# Patient Record
Sex: FEMALE | Race: WHITE | NOT HISPANIC OR LATINO | Employment: FULL TIME | URBAN - METROPOLITAN AREA
[De-identification: names, ages, dates, MRNs, and addresses within clinical notes are randomized per-mention and may not be internally consistent; named-entity substitution may affect disease eponyms.]

---
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
## 2020-02-16 ENCOUNTER — HOSPITAL ENCOUNTER (EMERGENCY)
Facility: HOSPITAL | Age: 20
Discharge: HOME | End: 2020-02-16
Attending: EMERGENCY MEDICINE | Admitting: EMERGENCY MEDICINE
Payer: COMMERCIAL

## 2020-02-16 ENCOUNTER — APPOINTMENT (EMERGENCY)
Dept: RADIOLOGY | Facility: HOSPITAL | Age: 20
End: 2020-02-16
Payer: COMMERCIAL

## 2020-02-16 VITALS
TEMPERATURE: 98 F | SYSTOLIC BLOOD PRESSURE: 142 MMHG | WEIGHT: 240 LBS | HEART RATE: 75 BPM | BODY MASS INDEX: 41.2 KG/M2 | OXYGEN SATURATION: 100 % | DIASTOLIC BLOOD PRESSURE: 77 MMHG | RESPIRATION RATE: 18 BRPM

## 2020-02-16 DIAGNOSIS — M77.8 TENDINITIS OF RIGHT WRIST: Primary | ICD-10-CM

## 2020-02-16 PROCEDURE — 99282 EMERGENCY DEPT VISIT SF MDM: CPT | Performed by: EMERGENCY MEDICINE

## 2020-02-16 PROCEDURE — 99283 EMERGENCY DEPT VISIT LOW MDM: CPT

## 2020-02-16 PROCEDURE — 73110 X-RAY EXAM OF WRIST: CPT

## 2020-02-16 RX ORDER — IBUPROFEN 400 MG/1
400 TABLET ORAL ONCE
Status: COMPLETED | OUTPATIENT
Start: 2020-02-16 | End: 2020-02-16

## 2020-02-16 RX ADMIN — IBUPROFEN 400 MG: 400 TABLET ORAL at 12:09

## 2020-02-16 NOTE — DISCHARGE INSTRUCTIONS
Return immediately if worse or any new symptoms  Tylenol 1000 mg every 6 hours as needed  and/or  Advil 400 mg every 6 hours as needed  May take both together  Ice 20 minutes hourly as needed

## 2020-02-16 NOTE — ED NOTES
Quickfit wrist II universal splint applied to the right wrist   Patient verbalized understanding of discharge instructions including medication administration and follow-up care  No further questions or concerns at this time          Afua Zavala RN  02/16/20 5945

## 2020-02-16 NOTE — ED PROVIDER NOTES
History  Chief Complaint   Patient presents with    Wrist Pain     Pt states she was at '[employer] practice yesterday when she noticed her R wrist started to bother her and swell  Pt used ice and advil for pain  Generalized right wrist pain worse after color guard Practice then competition over the past 2 days  Feels swollen  No numbness or weakness  No other complaints  Similar symptoms in the past  No direct injury      Wrist Pain   Onset quality:  Gradual  Timing:  Constant  Progression:  Unchanged      Prior to Admission Medications   Prescriptions Last Dose Informant Patient Reported? Taking? methylprednisolone (MEDROL) 4 mg tablet Not Taking at Unknown time  No No   Sig: Medrol dosepak, take as directed   Patient not taking: Reported on 2/16/2020      Facility-Administered Medications: None       Past Medical History:   Diagnosis Date    Anxiety     Depression     History of posttraumatic stress disorder (PTSD)        Past Surgical History:   Procedure Laterality Date    TYMPANOSTOMY TUBE PLACEMENT         History reviewed  No pertinent family history  I have reviewed and agree with the history as documented  Social History     Tobacco Use    Smoking status: Never Smoker    Smokeless tobacco: Never Used   Substance Use Topics    Alcohol use: No    Drug use: No       Review of Systems   All other systems reviewed and are negative  Physical Exam  Physical Exam   Constitutional: She appears well-developed  No distress  Musculoskeletal:   Right upper extremity   No shoulder or elbow tenderness forearm generally soft nontender, diffuse wrist tenderness with decreased range of motion, not obviously swollen compared to opposite, no hand or finger tenderness, good  strength, capillary refill   Nursing note and vitals reviewed        Vital Signs  ED Triage Vitals [02/16/20 1153]   Temperature Pulse Respirations Blood Pressure SpO2   98 °F (36 7 °C) 75 18 142/77 100 %      Temp Source Heart Rate Source Patient Position - Orthostatic VS BP Location FiO2 (%)   Oral Monitor Lying Left arm --      Pain Score       4           Vitals:    02/16/20 1153   BP: 142/77   Pulse: 75   Patient Position - Orthostatic VS: Lying         Visual Acuity      ED Medications  Medications   ibuprofen (MOTRIN) tablet 400 mg (400 mg Oral Given 2/16/20 1209)       Diagnostic Studies  Results Reviewed     None                 XR wrist 3+ views RIGHT   ED Interpretation by Cathie Back DO (02/16 1257)   No fracture                 Procedures  Procedures         ED Course  ED Course as of Feb 16 1302   Sun Feb 16, 2020   1257 We discussed results and agreeable with close outpatient follow-up, splinting, Tylenol and Advil, follow-up with her family physician                                  MDM      Disposition  Final diagnoses:   Tendinitis of right wrist     Time reflects when diagnosis was documented in both MDM as applicable and the Disposition within this note     Time User Action Codes Description Comment    2/16/2020 12:59 PM Vonnie Richardson Add [M77 8] Tendinitis of right wrist       ED Disposition     ED Disposition Condition Date/Time Comment    Discharge Stable Sun Feb 16, 2020 12:59 PM Mariela Aguilar discharge to home/self care  Follow-up Information     Follow up With Specialties Details Why [address] [facility] Schedule an appointment as soon as possible for a visit in 1 week  Paul Andujar [phone]  [phone]            Patient's Medications   Discharge Prescriptions    No medications on file     No discharge procedures on file      PDMP Review     None          ED Provider  Electronically Signed by           Cathie Back DO  02/16/20 1302

## 2020-06-14 ENCOUNTER — HOSPITAL ENCOUNTER (EMERGENCY)
Facility: HOSPITAL | Age: 20
Discharge: HOME | End: 2020-06-14
Attending: EMERGENCY MEDICINE | Admitting: EMERGENCY MEDICINE
Payer: COMMERCIAL

## 2020-06-14 ENCOUNTER — APPOINTMENT (EMERGENCY)
Dept: RADIOLOGY | Facility: HOSPITAL | Age: 20
End: 2020-06-14
Payer: COMMERCIAL

## 2020-06-14 VITALS
RESPIRATION RATE: 20 BRPM | SYSTOLIC BLOOD PRESSURE: 169 MMHG | HEIGHT: 64 IN | OXYGEN SATURATION: 98 % | DIASTOLIC BLOOD PRESSURE: 105 MMHG | BODY MASS INDEX: 47.8 KG/M2 | WEIGHT: 280 LBS | TEMPERATURE: 98.7 F | HEART RATE: 78 BPM

## 2020-06-14 DIAGNOSIS — S63.634A SPRAIN OF INTERPHALANGEAL JOINT OF RIGHT RING FINGER, INITIAL ENCOUNTER: Primary | ICD-10-CM

## 2020-06-14 PROCEDURE — 99284 EMERGENCY DEPT VISIT MOD MDM: CPT | Performed by: EMERGENCY MEDICINE

## 2020-06-14 PROCEDURE — 73140 X-RAY EXAM OF FINGER(S): CPT

## 2020-06-14 PROCEDURE — 99283 EMERGENCY DEPT VISIT LOW MDM: CPT

## 2020-06-14 RX ORDER — NORETHINDRONE ACETATE AND ETHINYL ESTRADIOL 1MG-20(21)
1 KIT ORAL
COMMUNITY
Start: 2016-03-09 | End: 2022-05-18

## 2020-06-14 RX ORDER — MELATONIN 10 MG
10 CAPSULE ORAL
COMMUNITY

## 2020-09-27 ENCOUNTER — APPOINTMENT (EMERGENCY)
Dept: RADIOLOGY | Facility: HOSPITAL | Age: 20
End: 2020-09-27
Payer: COMMERCIAL

## 2020-09-27 ENCOUNTER — HOSPITAL ENCOUNTER (EMERGENCY)
Facility: HOSPITAL | Age: 20
Discharge: HOME | End: 2020-09-27
Attending: EMERGENCY MEDICINE | Admitting: EMERGENCY MEDICINE
Payer: COMMERCIAL

## 2020-09-27 VITALS
BODY MASS INDEX: 50.06 KG/M2 | SYSTOLIC BLOOD PRESSURE: 137 MMHG | TEMPERATURE: 96.9 F | RESPIRATION RATE: 18 BRPM | OXYGEN SATURATION: 97 % | DIASTOLIC BLOOD PRESSURE: 92 MMHG | HEART RATE: 113 BPM | WEIGHT: 291.67 LBS

## 2020-09-27 DIAGNOSIS — S90.32XA CONTUSION OF LEFT FOOT, INITIAL ENCOUNTER: Primary | ICD-10-CM

## 2020-09-27 PROCEDURE — 99284 EMERGENCY DEPT VISIT MOD MDM: CPT | Performed by: EMERGENCY MEDICINE

## 2020-09-27 PROCEDURE — 73630 X-RAY EXAM OF FOOT: CPT

## 2020-09-27 PROCEDURE — 99283 EMERGENCY DEPT VISIT LOW MDM: CPT

## 2020-09-27 NOTE — ED PROVIDER NOTES
History  Chief Complaint   Patient presents with   [address] [facility] Injury     Patient dropped a rifle on left foot  Patient is a 42-year-old presenting to the emergency department for complaints of injury to left foot that occurred on Friday, she was practicing color guard and her rifle fell on her foot causing the injury, she has been ambulating on it but it has caused her has significant amount of pain, she took Tylenol prior to coming to the emergency department, denies pain or injury elsewhere          Prior to Admission Medications   Prescriptions Last Dose Informant Patient Reported? Taking? Melatonin 10 MG CAPS   Yes No   Sig: Take 10 mg by mouth   norethindrone-ethinyl estradiol (JUNEL FE 1/20) 1-20 MG-MCG per tablet   Yes No   Sig: Take 1 tablet by mouth      Facility-Administered Medications: None       Past Medical History:   Diagnosis Date    ADHD     Anxiety     Depression     History of posttraumatic stress disorder (PTSD)        Past Surgical History:   Procedure Laterality Date    TYMPANOSTOMY TUBE PLACEMENT         History reviewed  No pertinent family history  I have reviewed and agree with the history as documented  E-Cigarette/Vaping    E-Cigarette Use Never User      E-Cigarette/Vaping Substances    Nicotine No     THC No     CBD No     Flavoring No     Other No     Unknown No      Social History     Tobacco Use    Smoking status: Never Smoker    Smokeless tobacco: Never Used   Substance Use Topics    Alcohol use: No    Drug use: No       Review of Systems   Constitutional: Negative  HENT: Negative  Eyes: Negative  Respiratory: Negative  Cardiovascular: Negative  Gastrointestinal: Negative  Endocrine: Negative  Genitourinary: Negative  Musculoskeletal: Positive for arthralgias (Left foot pain)  Skin: Negative  Allergic/Immunologic: Negative  Neurological: Negative  Hematological: Negative  Psychiatric/Behavioral: Negative          Physical Exam  Physical Exam  Constitutional:       Appearance: She is well-developed  HENT:      Head: Normocephalic and atraumatic  Eyes:      Conjunctiva/sclera: Conjunctivae normal       Pupils: Pupils are equal, round, and reactive to light  Neck:      Musculoskeletal: Normal range of motion and neck supple  Cardiovascular:      Rate and Rhythm: Normal rate  Pulmonary:      Effort: Pulmonary effort is normal    Abdominal:      Palpations: Abdomen is soft  Musculoskeletal: Normal range of motion  Feet:    Feet:      Comments: Tenderness with slight ecchymosis and swelling over the base of the 5th metatarsal of the left foot, distal sensation and motor is intact with brisk capillary refill  Skin:     General: Skin is warm and dry  Neurological:      Mental Status: She is alert and oriented to person, place, and time           Vital Signs  ED Triage Vitals [09/27/20 1906]   Temperature Pulse Respirations Blood Pressure SpO2   (!) 96 9 °F (36 1 °C) (!) 113 18 137/92 97 %      Temp Source Heart Rate Source Patient Position - Orthostatic VS BP Location FiO2 (%)   Temporal Monitor Sitting Right arm --      Pain Score       --           Vitals:    09/27/20 1906   BP: 137/92   Pulse: (!) 113   Patient Position - Orthostatic VS: Sitting             ED Medications  Medications - No data to display    Diagnostic Studies  Results Reviewed     None                 XR foot 3+ views LEFT   ED Interpretation by Kris Ryan DO (09/27 1930)   No acute findings                       ED Course  ED Course as of Sep 27 1942   Sun Sep 27, 2020   1941 Imaging findings unremarkable and discussed at bedside, patient advised to take over-the-counter anti-inflammatories, ice and elevate, follow up with primary care as needed or return if symptoms worsen, patient acknowledges understanding and agreement with this plan                                            Disposition  Final diagnoses:   Contusion of left foot, initial encounter     Time reflects when diagnosis was documented in both MDM as applicable and the Disposition within this note     Time User Action Codes Description Comment    9/27/2020  7:30 PM Lissette Savage [place] [place] Contusion of left foot, initial encounter       ED Disposition     ED Disposition Condition Date/Time Comment    Discharge Stable Sun Sep 27, 2020  7:30 PM Philomena Aguilar discharge to home/self care  Follow-up Information     Follow up With Specialties Details Why Contact Info    Lj Freed DO General Practice In 2 days  Paul Andujar [phone]  [phone]            Discharge Medication List as of 9/27/2020  7:30 PM      CONTINUE these medications which have NOT CHANGED    Details   Melatonin 10 MG CAPS Take 10 mg by mouth, Historical Med      norethindrone-ethinyl estradiol (JUNEL FE 1/20) 1-20 MG-MCG per tablet Take 1 tablet by mouth, Starting Wed 3/9/2016, Historical Med           No discharge procedures on file      PDMP Review     None          ED Provider  Electronically Signed by           Sravan Feliciano DO  09/27/20 1949

## 2021-03-10 DIAGNOSIS — Z23 ENCOUNTER FOR IMMUNIZATION: ICD-10-CM

## 2022-05-18 ENCOUNTER — HOSPITAL ENCOUNTER (EMERGENCY)
Facility: HOSPITAL | Age: 22
Discharge: HOME | End: 2022-05-18
Attending: EMERGENCY MEDICINE
Payer: COMMERCIAL

## 2022-05-18 VITALS
OXYGEN SATURATION: 98 % | DIASTOLIC BLOOD PRESSURE: 98 MMHG | HEART RATE: 103 BPM | SYSTOLIC BLOOD PRESSURE: 164 MMHG | BODY MASS INDEX: 50.02 KG/M2 | HEIGHT: 64 IN | WEIGHT: 293 LBS | TEMPERATURE: 96.1 F | RESPIRATION RATE: 16 BRPM

## 2022-05-18 DIAGNOSIS — J03.90 TONSILLITIS: Primary | ICD-10-CM

## 2022-05-18 LAB — S PYO DNA THROAT QL NAA+PROBE: NOT DETECTED

## 2022-05-18 PROCEDURE — 99284 EMERGENCY DEPT VISIT MOD MDM: CPT | Performed by: PHYSICIAN ASSISTANT

## 2022-05-18 PROCEDURE — 87651 STREP A DNA AMP PROBE: CPT | Performed by: PHYSICIAN ASSISTANT

## 2022-05-18 PROCEDURE — 96372 THER/PROPH/DIAG INJ SC/IM: CPT

## 2022-05-18 PROCEDURE — 99283 EMERGENCY DEPT VISIT LOW MDM: CPT

## 2022-05-18 RX ORDER — AMOXICILLIN AND CLAVULANATE POTASSIUM 875; 125 MG/1; MG/1
1 TABLET, FILM COATED ORAL EVERY 12 HOURS SCHEDULED
Qty: 14 TABLET | Refills: 0 | Status: SHIPPED | OUTPATIENT
Start: 2022-05-18 | End: 2022-05-25

## 2022-05-18 RX ORDER — FLUTICASONE PROPIONATE 50 MCG
1 SPRAY, SUSPENSION (ML) NASAL DAILY
Qty: 16 G | Refills: 0 | Status: SHIPPED | OUTPATIENT
Start: 2022-05-18

## 2022-05-18 RX ORDER — KETOROLAC TROMETHAMINE 30 MG/ML
30 INJECTION, SOLUTION INTRAMUSCULAR; INTRAVENOUS ONCE
Status: COMPLETED | OUTPATIENT
Start: 2022-05-18 | End: 2022-05-18

## 2022-05-18 RX ORDER — BUSPIRONE HYDROCHLORIDE 5 MG/1
5 TABLET ORAL 3 TIMES DAILY
COMMUNITY
Start: 2022-04-26

## 2022-05-18 RX ORDER — DEXAMETHASONE SODIUM PHOSPHATE 4 MG/ML
10 INJECTION, SOLUTION INTRA-ARTICULAR; INTRALESIONAL; INTRAMUSCULAR; INTRAVENOUS; SOFT TISSUE ONCE
Status: COMPLETED | OUTPATIENT
Start: 2022-05-18 | End: 2022-05-18

## 2022-05-18 RX ORDER — AMOXICILLIN AND CLAVULANATE POTASSIUM 875; 125 MG/1; MG/1
1 TABLET, FILM COATED ORAL ONCE
Status: COMPLETED | OUTPATIENT
Start: 2022-05-18 | End: 2022-05-18

## 2022-05-18 RX ORDER — TESTOSTERONE GEL, 1% 10 MG/G
GEL TRANSDERMAL
COMMUNITY
Start: 2021-12-20

## 2022-05-18 RX ORDER — ONDANSETRON 4 MG/1
4 TABLET, ORALLY DISINTEGRATING ORAL EVERY 6 HOURS PRN
Qty: 20 TABLET | Refills: 0 | Status: SHIPPED | OUTPATIENT
Start: 2022-05-18

## 2022-05-18 RX ORDER — NAPROXEN 500 MG/1
500 TABLET ORAL 2 TIMES DAILY WITH MEALS
Qty: 14 TABLET | Refills: 0 | Status: SHIPPED | OUTPATIENT
Start: 2022-05-18 | End: 2022-05-25

## 2022-05-18 RX ADMIN — AMOXICILLIN AND CLAVULANATE POTASSIUM 1 TABLET: 875; 125 TABLET, FILM COATED ORAL at 14:35

## 2022-05-18 RX ADMIN — KETOROLAC TROMETHAMINE 30 MG: 30 INJECTION, SOLUTION INTRAMUSCULAR at 14:00

## 2022-05-18 RX ADMIN — DEXAMETHASONE SODIUM PHOSPHATE 10 MG: 4 INJECTION, SOLUTION INTRAMUSCULAR; INTRAVENOUS at 14:02

## 2022-05-18 NOTE — ED PROVIDER NOTES
History  Chief Complaint   Patient presents with    Sore Throat     Ongoing for 1 week, finished steroids with no relief     The patient is a 58-year-old female who presents emergency department today with a complaint of sore throat and enlarged tonsils  The patient states that she sought evaluation 5 days ago at a local urgent care for her enlarged tonsils and sore throat  She states that she does suffer from seasonal allergies had has been taking daily OTC Claritin  She was strep tested, found to be negative, discharged on 4 days of 50 mg prednisone  She states that this was finished however the symptoms have worsened  She does have nasal congestion, rhinorrhea and postnasal drip  No drooling, fevers chills, pain with neck movement, dental pain shortness of breath, wheezing, chest pain  Hasn't taken anything PTA  Sore Throat  Location:  Generalized  Quality:  Sore  Severity:  Severe  Onset quality:  Gradual  Timing:  Constant  Progression:  Worsening  Chronicity:  New  Relieved by:  Nothing  Worsened by:  Swallowing  Ineffective treatments: predisone  Associated symptoms: postnasal drip    Associated symptoms: no adenopathy, no chest pain, no cough, no drooling, no ear discharge, no ear pain, no epistaxis, no eye discharge, no plugged ear sensation, no shortness of breath, no trouble swallowing and no voice change        Prior to Admission Medications   Prescriptions Last Dose Informant Patient Reported? Taking? Melatonin 10 MG CAPS   Yes No   Sig: Take 10 mg by mouth   busPIRone (BUSPAR) 5 mg tablet   Yes Yes   Sig: Take 5 mg by mouth in the morning and 5 mg at noon and 5 mg in the evening     testosterone (ANDROGEL) 1%   Yes Yes      Facility-Administered Medications: None       Past Medical History:   Diagnosis Date    ADHD     Anxiety     Depression     History of posttraumatic stress disorder (PTSD)        Past Surgical History:   Procedure Laterality Date    TYMPANOSTOMY TUBE PLACEMENT History reviewed  No pertinent family history  I have reviewed and agree with the history as documented  E-Cigarette/Vaping    E-Cigarette Use Never User      E-Cigarette/Vaping Substances    Nicotine No     THC No     CBD No     Flavoring No     Other No     Unknown No      Social History     Tobacco Use    Smoking status: Never Smoker    Smokeless tobacco: Never Used   Vaping Use    Vaping Use: Never used   Substance Use Topics    Alcohol use: No    Drug use: No       Review of Systems   HENT: Positive for postnasal drip and sore throat  Negative for drooling, ear discharge, ear pain, nosebleeds, trouble swallowing and voice change  Eyes: Negative for discharge  Respiratory: Negative for cough and shortness of breath  Cardiovascular: Negative for chest pain  Hematological: Negative for adenopathy  All other systems reviewed and are negative  Physical Exam  Physical Exam  Vitals and nursing note reviewed  Constitutional:       General: She is not in acute distress  Appearance: She is well-developed  HENT:      Head: Normocephalic and atraumatic  Nose: No congestion or rhinorrhea  Mouth/Throat:      Mouth: Mucous membranes are moist  No angioedema  Dentition: No gingival swelling or dental abscesses  Pharynx: Uvula midline  Posterior oropharyngeal erythema present  No oropharyngeal exudate or uvula swelling  Tonsils: No tonsillar exudate or tonsillar abscesses  2+ on the right  2+ on the left  Comments: +postnasal drip   Eyes:      Conjunctiva/sclera: Conjunctivae normal       Pupils: Pupils are equal, round, and reactive to light  Cardiovascular:      Rate and Rhythm: Normal rate and regular rhythm  Heart sounds: Normal heart sounds  No murmur heard  Pulmonary:      Effort: Pulmonary effort is normal  No respiratory distress  Breath sounds: Normal breath sounds  Abdominal:      Palpations: Abdomen is soft        Tenderness: There is no abdominal tenderness  Musculoskeletal:      Cervical back: Normal range of motion and neck supple  Skin:     General: Skin is warm and dry  Capillary Refill: Capillary refill takes less than 2 seconds  Neurological:      General: No focal deficit present  Mental Status: She is alert and oriented to person, place, and time           Vital Signs  ED Triage Vitals [05/18/22 1328]   Temperature Pulse Respirations Blood Pressure SpO2   (!) 96 1 °F (35 6 °C) 103 16 164/98 98 %      Temp Source Heart Rate Source Patient Position - Orthostatic VS BP Location FiO2 (%)   Temporal Monitor Sitting Right arm --      Pain Score       7           Vitals:    05/18/22 1328   BP: 164/98   Pulse: 103   Patient Position - Orthostatic VS: Sitting         Visual Acuity      ED Medications  Medications   ketorolac (TORADOL) injection 30 mg (30 mg Intramuscular Given 5/18/22 1400)   dexamethasone (DECADRON) injection 10 mg (10 mg Intramuscular Given 5/18/22 1402)   amoxicillin-clavulanate (AUGMENTIN) 875-125 mg per tablet 1 tablet (1 tablet Oral Given 5/18/22 1435)       Diagnostic Studies  Results Reviewed     Procedure Component Value Units Date/Time    Strep A PCR [[id number]]  (Normal) Collected: 05/18/22 1352    Lab Status: Final result Specimen: Throat Updated: 05/18/22 1421     STREP A PCR Not Detected                 No orders to display              Procedures  Procedures         ED Course                                             MDM  Number of Diagnoses or Management Options     Amount and/or Complexity of Data Reviewed  Clinical lab tests: ordered and reviewed  Decide to obtain previous medical records or to obtain history from someone other than the patient: yes  Review and summarize past medical records: yes  Independent visualization of images, tracings, or specimens: yes    Risk of Complications, Morbidity, and/or Mortality  Presenting problems: low  Diagnostic procedures: low  Management options: low    Patient Progress  Patient progress: stable      Disposition  Final diagnoses: Tonsillitis     Time reflects when diagnosis was documented in both MDM as applicable and the Disposition within this note     Time User Action Codes Description Comment    5/18/2022  2:31 PM Casie Posadas Adalberto [T60 26] Tonsillitis       ED Disposition     ED Disposition   Discharge    Condition   Stable    Date/Time   Wed May 18, 2022  2:31 PM    Comment   Constantine Pope Narcisofrchance discharge to home/self care  Follow-up Information    None         Discharge Medication List as of 5/18/2022  2:32 PM      START taking these medications    Details   amoxicillin-clavulanate (AUGMENTIN) 875-125 mg per tablet Take 1 tablet by mouth every 12 (twelve) hours for 7 days, Starting Wed 5/18/2022, Until Wed 5/25/2022, Normal      fluticasone (FLONASE) 50 mcg/act nasal spray 1 spray into each nostril in the morning , Starting Wed 5/18/2022, Normal      naproxen (NAPROSYN) 500 mg tablet Take 1 tablet (500 mg total) by mouth in the morning and 1 tablet (500 mg total) in the evening  Take with meals  Do all this for 7 days  , Starting Wed 5/18/2022, Until Wed 5/25/2022, Normal      ondansetron (Zofran ODT) 4 mg disintegrating tablet Take 1 tablet (4 mg total) by mouth every 6 (six) hours as needed for nausea or vomiting, Starting Wed 5/18/2022, Normal         CONTINUE these medications which have NOT CHANGED    Details   busPIRone (BUSPAR) 5 mg tablet Take 5 mg by mouth in the morning and 5 mg at noon and 5 mg in the evening , Starting Tue 4/26/2022, Historical Med      testosterone (ANDROGEL) 1% Starting Mon 12/20/2021, Historical Med      Melatonin 10 MG CAPS Take 10 mg by mouth, Historical Med             No discharge procedures on file      PDMP Review     None          ED Provider  Electronically Signed by           Hever Rucker PA-C  05/18/22 7441

## 2023-06-08 ENCOUNTER — APPOINTMENT (EMERGENCY)
Dept: RADIOLOGY | Facility: HOSPITAL | Age: 23
End: 2023-06-08
Payer: COMMERCIAL

## 2023-06-08 ENCOUNTER — HOSPITAL ENCOUNTER (EMERGENCY)
Facility: HOSPITAL | Age: 23
Discharge: HOME | End: 2023-06-08
Attending: EMERGENCY MEDICINE | Admitting: EMERGENCY MEDICINE
Payer: COMMERCIAL

## 2023-06-08 VITALS
DIASTOLIC BLOOD PRESSURE: 79 MMHG | OXYGEN SATURATION: 97 % | TEMPERATURE: 98.1 F | SYSTOLIC BLOOD PRESSURE: 131 MMHG | HEART RATE: 81 BPM | RESPIRATION RATE: 18 BRPM

## 2023-06-08 DIAGNOSIS — J40 BRONCHITIS: Primary | ICD-10-CM

## 2023-06-08 PROCEDURE — 71046 X-RAY EXAM CHEST 2 VIEWS: CPT

## 2023-06-08 RX ORDER — PREDNISONE 20 MG/1
40 TABLET ORAL DAILY
Qty: 10 TABLET | Refills: 0 | Status: SHIPPED | OUTPATIENT
Start: 2023-06-08 | End: 2023-06-13

## 2023-06-08 RX ORDER — CEFUROXIME AXETIL 500 MG/1
500 TABLET ORAL EVERY 12 HOURS SCHEDULED
Qty: 20 TABLET | Refills: 0 | Status: SHIPPED | OUTPATIENT
Start: 2023-06-08 | End: 2023-06-18

## 2023-06-08 RX ORDER — ALBUTEROL SULFATE 90 UG/1
2 AEROSOL, METERED RESPIRATORY (INHALATION) EVERY 6 HOURS PRN
Qty: 6.7 G | Refills: 0 | Status: SHIPPED | OUTPATIENT
Start: 2023-06-08

## 2023-06-08 NOTE — ED PROVIDER NOTES
History  Chief Complaint   Patient presents with   • Cough     Patient c/o cough     Patient been sick for the past 2 weeks  Congestion with a cough  No bringing up phlegm  Does not smoke  No fevers or chills  No nausea vomiting or diarrhea  No ear pain  No headaches  History provided by:  Patient   used: No    Cough  Cough characteristics:  Productive  Sputum characteristics:  Nondescript  Severity:  Mild  Onset quality:  Gradual  Duration:  2 weeks  Timing:  Constant  Progression:  Worsening  Chronicity:  New  Smoker: no    Context: smoke exposure and upper respiratory infection    Context: not animal exposure, not exposure to allergens and not weather changes    Relieved by:  Nothing  Worsened by:  Nothing  Ineffective treatments:  None tried  Associated symptoms: rhinorrhea and sinus congestion    Associated symptoms: no chest pain, no chills, no diaphoresis, no ear pain, no eye discharge, no fever, no headaches, no rash, no shortness of breath, no sore throat and no wheezing        Prior to Admission Medications   Prescriptions Last Dose Informant Patient Reported? Taking? Melatonin 10 MG CAPS   Yes No   Sig: Take 10 mg by mouth   busPIRone (BUSPAR) 5 mg tablet   Yes No   Sig: Take 5 mg by mouth in the morning and 5 mg at noon and 5 mg in the evening  fluticasone (FLONASE) 50 mcg/act nasal spray   No No   Si spray into each nostril in the morning  naproxen (NAPROSYN) 500 mg tablet   No No   Sig: Take 1 tablet (500 mg total) by mouth in the morning and 1 tablet (500 mg total) in the evening  Take with meals  Do all this for 7 days     ondansetron (Zofran ODT) 4 mg disintegrating tablet   No No   Sig: Take 1 tablet (4 mg total) by mouth every 6 (six) hours as needed for nausea or vomiting   testosterone (ANDROGEL) 1%   Yes No      Facility-Administered Medications: None       Past Medical History:   Diagnosis Date   • ADHD    • Anxiety    • Depression    • History of posttraumatic stress disorder (PTSD)        Past Surgical History:   Procedure Laterality Date   • TYMPANOSTOMY TUBE PLACEMENT         History reviewed  No pertinent family history  I have reviewed and agree with the history as documented  E-Cigarette/Vaping   • E-Cigarette Use Never User      E-Cigarette/Vaping Substances   • Nicotine No    • THC No    • CBD No    • Flavoring No    • Other No    • Unknown No      Social History     Tobacco Use   • Smoking status: Never   • Smokeless tobacco: Never   Vaping Use   • Vaping Use: Never used   Substance Use Topics   • Alcohol use: No   • Drug use: No       Review of Systems   Constitutional: Negative for chills, diaphoresis and fever  HENT: Positive for rhinorrhea  Negative for ear pain, hearing loss, sore throat, trouble swallowing and voice change  Eyes: Negative for pain and discharge  Respiratory: Positive for cough  Negative for shortness of breath and wheezing  Cardiovascular: Negative for chest pain and palpitations  Gastrointestinal: Negative for abdominal pain, blood in stool, constipation, diarrhea, nausea and vomiting  Genitourinary: Negative for dysuria, flank pain, frequency and hematuria  Musculoskeletal: Negative for joint swelling, neck pain and neck stiffness  Skin: Negative for rash and wound  Neurological: Negative for dizziness, seizures, syncope, facial asymmetry and headaches  Psychiatric/Behavioral: Negative for hallucinations, self-injury and suicidal ideas  All other systems reviewed and are negative  Physical Exam  Physical Exam  Vitals and nursing note reviewed  Constitutional:       General: Anayeli Laird is not in acute distress  Appearance: Sherial Showers Lauren is well-developed  HENT:      Head: Normocephalic and atraumatic  Right Ear: External ear normal       Left Ear: External ear normal    Eyes:      General: No scleral icterus  Right eye: No discharge           Left eye: No discharge  Extraocular Movements: Extraocular movements intact  Conjunctiva/sclera: Conjunctivae normal    Cardiovascular:      Rate and Rhythm: Normal rate and regular rhythm  Heart sounds: Normal heart sounds  No murmur heard  Pulmonary:      Effort: Pulmonary effort is normal       Breath sounds: Normal breath sounds  No wheezing or rales  Abdominal:      General: Bowel sounds are normal  There is no distension  Palpations: Abdomen is soft  Tenderness: There is no abdominal tenderness  There is no guarding or rebound  Musculoskeletal:         General: No deformity  Normal range of motion  Cervical back: Normal range of motion and neck supple  Skin:     General: Skin is warm and dry  Findings: No rash  Neurological:      General: No focal deficit present  Mental Status: Homa Smith is alert and oriented to person, place, and time  Cranial Nerves: No cranial nerve deficit  Psychiatric:         Mood and Affect: Mood normal          Behavior: Behavior normal          Thought Content: Thought content normal          Judgment: Judgment normal          Vital Signs  ED Triage Vitals [06/08/23 0842]   Temperature Pulse Respirations Blood Pressure SpO2   98 1 °F (36 7 °C) 81 18 131/79 97 %      Temp Source Heart Rate Source Patient Position - Orthostatic VS BP Location FiO2 (%)   Temporal Monitor Lying Left arm --      Pain Score       --           Vitals:    06/08/23 0842   BP: 131/79   Pulse: 81   Patient Position - Orthostatic VS: Lying         Visual Acuity      ED Medications  Medications - No data to display    Diagnostic Studies  Results Reviewed     None                 XR chest 2 views    (Results Pending)              Procedures  Procedures         ED Course  ED Course as of 06/08/23 0859   Thu Jun 08, 2023   0856 Chest x-ray with no acute disease                                 SBIRT 20yo+    Flowsheet Row Most Recent Value   Initial Alcohol Screen: US AUDIT-C     1  How often do you have a drink containing alcohol? 0 Filed at: 06/08/2023 0850   2  How many drinks containing alcohol do you have on a typical day you are drinking? 0 Filed at: 06/08/2023 0850   3a  Male UNDER 65: How often do you have five or more drinks on one occasion? 0 Filed at: 06/08/2023 0850   3b  FEMALE Any Age, or MALE 65+: How often do you have 4 or more drinks on one occassion? 0 Filed at: 06/08/2023 0850   Audit-C Score 0 Filed at: 06/08/2023 3582   CHANDRAKANT: How many times in the past year have you    Used an illegal drug or used a prescription medication for non-medical reasons? Never Filed at: 06/08/2023 4211                    Medical Decision Making  Amount and/or Complexity of Data Reviewed  Radiology: ordered and independent interpretation performed  Decision-making details documented in ED Course  Discussion of management or test interpretation with external provider(s): Patient with cold symptoms  Could be viral syndrome versus bronchitis versus pneumonia  We will get a chest x-ray  Disposition  Final diagnoses:   Bronchitis     Time reflects when diagnosis was documented in both MDM as applicable and the Disposition within this note     Time User Action Codes Description Comment    6/8/2023  8:49 AM Epi Aguilera Add [J40] Bronchitis       ED Disposition     ED Disposition   Discharge    Condition   Stable    Date/Time   Thu Jun 8, 2023  8:49 AM    Comment   Sudha Aguilar discharge to home/self care                 Follow-up Information     Follow up With Specialties Details Why Contact Josef Mccartney DO General Practice Call in 1 day  [address]  [phone]            Patient's Medications   Discharge Prescriptions    ALBUTEROL (PROVENTIL HFA) 90 MCG/ACT INHALER    Inhale 2 puffs every 6 (six) hours as needed for wheezing       Start Date: 6/8/2023  End Date: --       Order Dose: 2 puffs       Quantity: 6 7 g Refills: 0    CEFUROXIME (CEFTIN) 500 MG TABLET    Take 1 tablet (500 mg total) by mouth every 12 (twelve) hours for 10 days       Start Date: 6/8/2023  End Date: 6/18/2023       Order Dose: 500 mg       Quantity: 20 tablet    Refills: 0    PREDNISONE 20 MG TABLET    Take 2 tablets (40 mg total) by mouth daily for 5 days       Start Date: 6/8/2023  End Date: 6/13/2023       Order Dose: 40 mg       Quantity: 10 tablet    Refills: 0       No discharge procedures on file      PDMP Review     None          ED Provider  Electronically Signed by           Yesenia Duenas MD  06/08/23 [id number] [facility] Jocelyn Felix MD  06/08/23 0006

## 2023-07-27 ENCOUNTER — APPOINTMENT (EMERGENCY)
Dept: RADIOLOGY | Facility: HOSPITAL | Age: 23
End: 2023-07-27
Payer: COMMERCIAL

## 2023-07-27 ENCOUNTER — HOSPITAL ENCOUNTER (EMERGENCY)
Facility: HOSPITAL | Age: 23
Discharge: HOME | End: 2023-07-27
Attending: EMERGENCY MEDICINE | Admitting: EMERGENCY MEDICINE
Payer: COMMERCIAL

## 2023-07-27 VITALS
OXYGEN SATURATION: 97 % | RESPIRATION RATE: 18 BRPM | TEMPERATURE: 97.2 F | SYSTOLIC BLOOD PRESSURE: 160 MMHG | DIASTOLIC BLOOD PRESSURE: 95 MMHG | HEART RATE: 93 BPM

## 2023-07-27 DIAGNOSIS — M79.672 LEFT FOOT PAIN: Primary | ICD-10-CM

## 2023-07-27 PROCEDURE — 73630 X-RAY EXAM OF FOOT: CPT

## 2023-07-27 PROCEDURE — 73610 X-RAY EXAM OF ANKLE: CPT

## 2023-07-27 NOTE — Clinical Note
Lashondakartiklaura Nguyen was seen and treated in our emergency department on 7/27/2023. Diagnosis:     Mat Mary  may return to work on return date. Corettadatrich Raymond may return on this date: 07/30/2023         If you have any questions or concerns, please don't hesitate to call.       Magaly Patten MD    ______________________________           _______________          _______________  Hospital Representative                              Date                                Time

## 2023-07-27 NOTE — Clinical Note
Daina Villasenor was seen and treated in our emergency department on 7/27/2023. No restrictions    Other - See Comments    none    Diagnosis:     Philomena Lee Corpus may return on this date: 07/28/2023    May wear boot to work and follow up with ortho     If you have any questions or concerns, please don't hesitate to call.       Caesar Kraus MD    ______________________________           _______________          _______________  Hospital Representative                              Date                                Time

## 2023-07-27 NOTE — Clinical Note
Lucy Ansari was seen and treated in our emergency department on 7/27/2023. Diagnosis:     Carol Leija  may return to work on return date. Nancy Folds may return on this date: 07/30/2023         If you have any questions or concerns, please don't hesitate to call.       Eric Mann MD    ______________________________           _______________          _______________  Hospital Representative                              Date                                Time

## 2023-07-27 NOTE — Clinical Note
Brian Holliday was seen and treated in our emergency department on 7/27/2023. No restrictions    Other - See Comments    none    Diagnosis:     Philomena Chambers may return on this date: 07/28/2023    May wear boot to work and follow up with ortho     If you have any questions or concerns, please don't hesitate to call.       Royer Rodriguez MD    ______________________________           _______________          _______________  Hospital Representative                              Date                                Time

## 2023-07-27 NOTE — Clinical Note
Sekou Mccray was seen and treated in our emergency department on 7/27/2023. Diagnosis:     Elkin De Leon  may return to work on return date. Jaren Delgado may return on this date: 07/30/2023         If you have any questions or concerns, please don't hesitate to call.       Alex Mederos MD    ______________________________           _______________          _______________  Hospital Representative                              Date                                Time

## 2023-07-27 NOTE — Clinical Note
Margie Campbell was seen and treated in our emergency department on 7/27/2023. Diagnosis:     Samia Orozco  may return to work on return date. Bri Rosenthal may return on this date: 07/30/2023         If you have any questions or concerns, please don't hesitate to call.       Bryan Bazan MD    ______________________________           _______________          _______________  Hospital Representative                              Date                                Time

## 2023-07-27 NOTE — Clinical Note
Gerri Ogden was seen and treated in our emergency department on 7/27/2023. Diagnosis:     Deo Crowley  may return to work on return date. Jani Hinds may return on this date: 07/30/2023         If you have any questions or concerns, please don't hesitate to call.       Sheldon Chi MD    ______________________________           _______________          _______________  Hospital Representative                              Date                                Time

## 2023-07-27 NOTE — Clinical Note
Savage Salazar was seen and treated in our emergency department on 7/27/2023. Diagnosis:     Marlon Matos  may return to work on return date. Joanna Hudson may return on this date: 07/30/2023         If you have any questions or concerns, please don't hesitate to call.       Teretha Holstein, MD    ______________________________           _______________          _______________  Hospital Representative                              Date                                Time

## 2023-07-28 NOTE — ED PROVIDER NOTES
History  Chief Complaint   Patient presents with   • Foot Pain     Patient reports left foot pain radiating to left ankle, denies injury but states "I work in a high intensity environment"     Patient planes of pain in the left foot for the past 6 days. Getting progressively worse. No known injury. Does have a past history of a broken foot. Ibuprofen without relief. Hurts to bear weight. Pain radiates to the ankle. History provided by:  Patient   used: No    Medical Problem  Location:  Left foot pain  Quality:  Achy  Severity:  Mild  Onset quality:  Gradual  Duration:  6 days  Timing:  Constant  Progression:  Worsening  Chronicity:  New  Context:  Atraumatic left foot pain  Relieved by:  Nothing  Worsened by:  Palpation and weightbearing  Ineffective treatments:  Ibuprofen  Associated symptoms: no abdominal pain, no chest pain, no congestion, no cough, no diarrhea, no ear pain, no fever, no headaches, no nausea, no rash, no shortness of breath, no sore throat, no vomiting and no wheezing        Prior to Admission Medications   Prescriptions Last Dose Informant Patient Reported? Taking? Prenatal Vit-Fe Fumarate-FA (M-VIT PO)   Yes Yes   Sig: Take by mouth   albuterol (Proventil HFA) 90 mcg/act inhaler Past Week  No Yes   Sig: Inhale 2 puffs every 6 (six) hours as needed for wheezing   busPIRone (BUSPAR) 5 mg tablet Past Week  Yes Yes   Sig: Take 5 mg by mouth in the morning and 5 mg at noon and 5 mg in the evening. testosterone (ANDROGEL) 1% 7/27/2023  Yes Yes      Facility-Administered Medications: None       Past Medical History:   Diagnosis Date   • ADHD    • Anxiety    • Depression    • History of posttraumatic stress disorder (PTSD)        Past Surgical History:   Procedure Laterality Date   • TYMPANOSTOMY TUBE PLACEMENT         History reviewed. No pertinent family history. I have reviewed and agree with the history as documented.     E-Cigarette/Vaping   • E-Cigarette Use Never User      E-Cigarette/Vaping Substances   • Nicotine No    • THC No    • CBD No    • Flavoring No    • Other No    • Unknown No      Social History     Tobacco Use   • Smoking status: Never   • Smokeless tobacco: Never   Vaping Use   • Vaping Use: Never used   Substance Use Topics   • Alcohol use: No   • Drug use: No       Review of Systems   Constitutional: Negative for chills and fever. HENT: Negative for congestion, ear pain, hearing loss, sore throat, trouble swallowing and voice change. Eyes: Negative for pain and discharge. Respiratory: Negative for cough, shortness of breath and wheezing. Cardiovascular: Negative for chest pain and palpitations. Gastrointestinal: Negative for abdominal pain, blood in stool, constipation, diarrhea, nausea and vomiting. Genitourinary: Negative for dysuria, flank pain, frequency and hematuria. Musculoskeletal: Positive for arthralgias. Negative for joint swelling, neck pain and neck stiffness. Skin: Negative for rash and wound. Neurological: Negative for dizziness, seizures, syncope, facial asymmetry and headaches. Psychiatric/Behavioral: Negative for hallucinations, self-injury and suicidal ideas. All other systems reviewed and are negative. Physical Exam  Physical Exam  Constitutional:       General: Audrene Folds is not in acute distress. Appearance: Normal appearance. Audrene Folds is not ill-appearing. HENT:      Head: Normocephalic and atraumatic. Right Ear: External ear normal.      Left Ear: External ear normal.      Nose: Nose normal.      Mouth/Throat:      Mouth: Mucous membranes are moist.   Eyes:      Extraocular Movements: Extraocular movements intact. Pupils: Pupils are equal, round, and reactive to light. Cardiovascular:      Rate and Rhythm: Normal rate and regular rhythm. Pulmonary:      Effort: Pulmonary effort is normal. No respiratory distress. Breath sounds: Normal breath sounds. Abdominal:      General: Abdomen is flat. Bowel sounds are normal. There is no distension. Palpations: Abdomen is soft. Tenderness: There is no abdominal tenderness. Musculoskeletal:         General: Tenderness present. No swelling. Cervical back: Normal range of motion and neck supple. Comments: Left ankle with no obvious bony deformity. Nontender palpation. Left foot is tender along the fifth metatarsal.  Dorsalis pedis pulse 2+. No obvious bony deformity. No swelling. No erythema or warmth. Skin:     General: Skin is warm and dry. Capillary Refill: Capillary refill takes less than 2 seconds. Neurological:      General: No focal deficit present. Mental Status: Justin Mayer is alert and oriented to person, place, and time.    Psychiatric:         Mood and Affect: Mood normal.         Behavior: Behavior normal.         Vital Signs  ED Triage Vitals   Temperature Pulse Respirations Blood Pressure SpO2   07/27/23 2205 07/27/23 2205 07/27/23 2205 07/27/23 2205 07/27/23 2205   (!) 97.2 °F (36.2 °C) 93 18 160/95 97 %      Temp Source Heart Rate Source Patient Position - Orthostatic VS BP Location FiO2 (%)   07/27/23 2205 07/27/23 2205 07/27/23 2205 07/27/23 2205 --   Temporal Monitor Sitting Left arm       Pain Score       07/27/23 2206       8           Vitals:    07/27/23 2205   BP: 160/95   Pulse: 93   Patient Position - Orthostatic VS: Sitting         Visual Acuity      ED Medications  Medications - No data to display    Diagnostic Studies  Results Reviewed     None                 XR foot 3+ views LEFT   ED Interpretation by Jayne Cotto MD (07/27 2227)   No fracture      XR ankle 3+ views LEFT   ED Interpretation by Jayne Cotto MD (07/27 2227)   No fracture                 Procedures  Splint application    Date/Time: 7/27/2023 10:27 PM    Performed by: Jayne Cotto MD  Authorized by: Jayne Cotto MD  Universal Protocol:  Procedure performed by:  Consent: Verbal consent not obtained. Consent given by: patient      Pre-procedure details:     Sensation:  Normal  Procedure details:     Laterality:  Left    Location:  Foot    Foot:  L foot    Strapping: yes  Cast type: Walking boot. Post-procedure details:     Pain:  Unchanged    Sensation:  Normal    Patient tolerance of procedure: Tolerated well, no immediate complications             ED Course                               SBIRT 20yo+    Flowsheet Row Most Recent Value   Initial Alcohol Screen: US AUDIT-C     1. How often do you have a drink containing alcohol? 0 Filed at: 07/27/2023 2210   2. How many drinks containing alcohol do you have on a typical day you are drinking? 0 Filed at: 07/27/2023 2210   3b. FEMALE Any Age, or MALE 65+: How often do you have 4 or more drinks on one occassion? 0 Filed at: 07/27/2023 2210   Audit-C Score 0 Filed at: 07/27/2023 2210   CHANDRAKANT: How many times in the past year have you. .. Used an illegal drug or used a prescription medication for non-medical reasons? Never Filed at: 07/27/2023 2210                    Medical Decision Making  Amount and/or Complexity of Data Reviewed  Radiology: ordered and independent interpretation performed. Decision-making details documented in ED Course. Discussion of management or test interpretation with external provider(s): Differential diagnosis includes fracture versus sprain versus tendinitis. Disposition  Final diagnoses:   Left foot pain     Time reflects when diagnosis was documented in both MDM as applicable and the Disposition within this note     Time User Action Codes Description Comment    7/27/2023 10:25 PM Domo Gnadhi Add [P23.143] Left foot pain       ED Disposition     ED Disposition   Discharge    Condition   Stable    Date/Time   Thu Jul 27, 2023 10:26 PM    Comment   Philomena Aguilar discharge to home/self care.                Follow-up Information     Follow up With Specialties Details Why Contact [address]  Orthopedic Surgery Call in 1 day  [address] [address]  [phone]            Patient's Medications   Discharge Prescriptions    No medications on file       No discharge procedures on file.     PDMP Review     None          ED Provider  Electronically Signed by           Govind Gagnon MD  07/27/23 4555

## 2023-12-14 ENCOUNTER — APPOINTMENT (EMERGENCY)
Dept: RADIOLOGY | Facility: HOSPITAL | Age: 23
End: 2023-12-14
Payer: COMMERCIAL

## 2023-12-14 ENCOUNTER — HOSPITAL ENCOUNTER (EMERGENCY)
Facility: HOSPITAL | Age: 23
Discharge: HOME | End: 2023-12-14
Attending: EMERGENCY MEDICINE
Payer: COMMERCIAL

## 2023-12-14 VITALS
DIASTOLIC BLOOD PRESSURE: 69 MMHG | HEIGHT: 64 IN | BODY MASS INDEX: 49.51 KG/M2 | SYSTOLIC BLOOD PRESSURE: 123 MMHG | WEIGHT: 290 LBS | TEMPERATURE: 98.4 F | HEART RATE: 99 BPM | RESPIRATION RATE: 18 BRPM | OXYGEN SATURATION: 98 %

## 2023-12-14 DIAGNOSIS — J40 BRONCHITIS: Primary | ICD-10-CM

## 2023-12-14 DIAGNOSIS — J06.9 URI (UPPER RESPIRATORY INFECTION): ICD-10-CM

## 2023-12-14 LAB
FLUAV RNA RESP QL NAA+PROBE: NEGATIVE
FLUBV RNA RESP QL NAA+PROBE: NEGATIVE
RSV RNA RESP QL NAA+PROBE: POSITIVE
SARS-COV-2 RNA RESP QL NAA+PROBE: NEGATIVE

## 2023-12-14 PROCEDURE — 0241U HB NFCT DS VIR RESP RNA 4 TRGT: CPT | Performed by: EMERGENCY MEDICINE

## 2023-12-14 PROCEDURE — 99283 EMERGENCY DEPT VISIT LOW MDM: CPT

## 2023-12-14 PROCEDURE — 93005 ELECTROCARDIOGRAM TRACING: CPT

## 2023-12-14 PROCEDURE — 71045 X-RAY EXAM CHEST 1 VIEW: CPT

## 2023-12-14 PROCEDURE — 99285 EMERGENCY DEPT VISIT HI MDM: CPT | Performed by: EMERGENCY MEDICINE

## 2023-12-14 RX ORDER — ALBUTEROL SULFATE 90 UG/1
2 AEROSOL, METERED RESPIRATORY (INHALATION) EVERY 6 HOURS PRN
Qty: 6.7 G | Refills: 0 | Status: SHIPPED | OUTPATIENT
Start: 2023-12-14

## 2023-12-14 RX ORDER — DOXYCYCLINE HYCLATE 100 MG/1
100 CAPSULE ORAL ONCE
Status: COMPLETED | OUTPATIENT
Start: 2023-12-14 | End: 2023-12-14

## 2023-12-14 RX ORDER — PREDNISONE 20 MG/1
60 TABLET ORAL ONCE
Status: COMPLETED | OUTPATIENT
Start: 2023-12-14 | End: 2023-12-14

## 2023-12-14 RX ORDER — PREDNISONE 20 MG/1
60 TABLET ORAL DAILY
Qty: 15 TABLET | Refills: 0 | Status: SHIPPED | OUTPATIENT
Start: 2023-12-14 | End: 2023-12-19

## 2023-12-14 RX ORDER — DOXYCYCLINE HYCLATE 100 MG/1
100 CAPSULE ORAL 2 TIMES DAILY
Qty: 14 CAPSULE | Refills: 0 | Status: SHIPPED | OUTPATIENT
Start: 2023-12-14 | End: 2023-12-21

## 2023-12-14 RX ORDER — ALBUTEROL SULFATE 90 UG/1
2 AEROSOL, METERED RESPIRATORY (INHALATION) ONCE
Status: COMPLETED | OUTPATIENT
Start: 2023-12-14 | End: 2023-12-14

## 2023-12-14 RX ADMIN — ALBUTEROL SULFATE 2 PUFF: 90 AEROSOL, METERED RESPIRATORY (INHALATION) at 23:37

## 2023-12-14 RX ADMIN — DOXYCYCLINE 100 MG: 100 CAPSULE ORAL at 23:36

## 2023-12-14 RX ADMIN — PREDNISONE 60 MG: 20 TABLET ORAL at 23:36

## 2023-12-14 NOTE — Clinical Note
Gilford Polio was seen and treated in our emergency department on 12/14/2023. Work note off work today and tomorrow    Diagnosis:     Luis Valdez  . Marianne Garcia may return on this date: If you have any questions or concerns, please don't hesitate to call.       Miguelito Cohn, DO    ______________________________           _______________          _______________  Hospital Representative                              Date                                Time

## 2023-12-15 LAB
ATRIAL RATE: 105 BPM
P AXIS: 47 DEGREES
PR INTERVAL: 130 MS
QRS AXIS: 10 DEGREES
QRSD INTERVAL: 98 MS
QT INTERVAL: 344 MS
QTC INTERVAL: 454 MS
T WAVE AXIS: 24 DEGREES
VENTRICULAR RATE: 105 BPM

## 2023-12-15 NOTE — ED PROVIDER NOTES
History  Chief Complaint   Patient presents with    Cough     Pt reports cough with chest tightness since yesterday, denies any fevers/chills     Patient is a 19-year-old presents emergency department complaining of cough and wheezing and chest tightness started yesterday with upper respiratory symptoms and worsened today has history of bronchitis. History provided by:  Patient      Prior to Admission Medications   Prescriptions Last Dose Informant Patient Reported? Taking? Prenatal Vit-Fe Fumarate-FA (M-VIT PO)   Yes No   Sig: Take by mouth   albuterol (Proventil HFA) 90 mcg/act inhaler   No No   Sig: Inhale 2 puffs every 6 (six) hours as needed for wheezing   busPIRone (BUSPAR) 5 mg tablet   Yes No   Sig: Take 5 mg by mouth in the morning and 5 mg at noon and 5 mg in the evening. testosterone (ANDROGEL) 1%   Yes No      Facility-Administered Medications: None       Past Medical History:   Diagnosis Date    ADHD     Anxiety     Depression     History of posttraumatic stress disorder (PTSD)        Past Surgical History:   Procedure Laterality Date    TYMPANOSTOMY TUBE PLACEMENT         History reviewed. No pertinent family history. I have reviewed and agree with the history as documented. E-Cigarette/Vaping    E-Cigarette Use Never User      E-Cigarette/Vaping Substances    Nicotine No     THC No     CBD No     Flavoring No     Other No     Unknown No      Social History     Tobacco Use    Smoking status: Never    Smokeless tobacco: Never   Vaping Use    Vaping status: Never Used   Substance Use Topics    Alcohol use: No    Drug use: No       Review of Systems   Constitutional:  Negative for activity change, appetite change, chills, fatigue and fever. HENT:  Positive for congestion and rhinorrhea. Negative for ear pain and sore throat. Eyes:  Negative for discharge, redness and visual disturbance. Respiratory:  Positive for cough, chest tightness and wheezing. Negative for shortness of breath. Cardiovascular:  Negative for chest pain and palpitations. Gastrointestinal:  Negative for abdominal pain, constipation, diarrhea, nausea and vomiting. Endocrine: Negative for polydipsia and polyuria. Genitourinary:  Negative for difficulty urinating, dysuria, frequency, hematuria and urgency. Musculoskeletal:  Negative for arthralgias and myalgias. Skin:  Negative for color change, pallor and rash. Neurological:  Negative for dizziness, weakness, light-headedness, numbness and headaches. Hematological:  Negative for adenopathy. Does not bruise/bleed easily. All other systems reviewed and are negative. Physical Exam  Physical Exam  Vitals and nursing note reviewed. Constitutional:       Appearance: Ericka Garrido is well-developed. HENT:      Head: Normocephalic and atraumatic. Right Ear: External ear normal.      Left Ear: External ear normal.      Nose: Congestion and rhinorrhea present. Eyes:      Conjunctiva/sclera: Conjunctivae normal.      Pupils: Pupils are equal, round, and reactive to light. Cardiovascular:      Rate and Rhythm: Normal rate and regular rhythm. Heart sounds: Normal heart sounds. Pulmonary:      Effort: Pulmonary effort is normal. No respiratory distress. Breath sounds: Wheezing present. No rales. Chest:      Chest wall: No tenderness. Abdominal:      General: Bowel sounds are normal. There is no distension. Palpations: Abdomen is soft. Tenderness: There is no abdominal tenderness. There is no guarding. Musculoskeletal:         General: Normal range of motion. Cervical back: Normal range of motion and neck supple. Skin:     General: Skin is warm and dry. Neurological:      Mental Status: Ericka Garrido is alert and oriented to person, place, and time. Cranial Nerves: No cranial nerve deficit. Sensory: No sensory deficit.          Vital Signs  ED Triage Vitals [12/14/23 2247]   Temperature Pulse Respirations Blood Pressure SpO2 98.4 °F (36.9 °C) (!) 112 20 (!) 139/102 99 %      Temp Source Heart Rate Source Patient Position - Orthostatic VS BP Location FiO2 (%)   Temporal Monitor Sitting Right arm --      Pain Score       8           Vitals:    12/14/23 2247 12/14/23 2300   BP: (!) 139/102 139/65   Pulse: (!) 112 (!) 111   Patient Position - Orthostatic VS: Sitting Sitting         Visual Acuity      ED Medications  Medications   predniSONE tablet 60 mg (has no administration in time range)   albuterol (PROVENTIL HFA,VENTOLIN HFA) inhaler 2 puff (has no administration in time range)   doxycycline hyclate (VIBRAMYCIN) capsule 100 mg (has no administration in time range)       Diagnostic Studies  Results Reviewed       Procedure Component Value Units Date/Time    FLU/RSV/COVID - if FLU/RSV clinically relevant [[id number]] Collected: 12/14/23 2301    Lab Status: In process Specimen: Nares from Nose Updated: 12/14/23 2304                   XR chest 1 view portable   ED Interpretation by Julio De Leon DO (12/14 2308)   No focal infiltrate                 Procedures  ECG 12 Lead Documentation Only    Date/Time: 12/14/2023 11:08 PM    Performed by: Julio De Leon DO  Authorized by: Julio De Leon DO    ECG reviewed by me, the ED Provider: yes    Patient location:  ED  Previous ECG:     Comparison to cardiac monitor: Yes    Quality:     Tracing quality:  Limited by artifact  Rate:     ECG rate:  105    ECG rate assessment: tachycardic    Rhythm:     Rhythm: sinus tachycardia    QRS:     QRS axis:  Left    QRS intervals: Wide  Conduction:     Conduction: abnormal      Abnormal conduction: incomplete RBBB    ST segments:     ST segments:  Normal           ED Course                                             Medical Decision Making  Differential diagnosis included but not limited to pneumonia arrhythmia upper respiratory infection.     Patient is afebrile nontoxic well-appearing clinically and hemodynamically stable in the emergency department history and examination consistent with upper respiratory infection and bronchitis no evidence of focal pneumonia on chest x-ray no evidence of acute cardiopulmonary pathology on EKG. for now recommended antibiotics steroid course metered-dose inhaler as needed and rest and plenty fluids and supportive care and prompt follow-up with primary physician for further evaluation and treatment and obtain test results return precautions and anticipatory guidance discussed. Problems Addressed:  Bronchitis: acute illness or injury  URI (upper respiratory infection): acute illness or injury    Amount and/or Complexity of Data Reviewed  Radiology: ordered and independent interpretation performed. Decision-making details documented in ED Course. ECG/medicine tests: ordered and independent interpretation performed. Decision-making details documented in ED Course. Disposition  Final diagnoses:   Bronchitis   URI (upper respiratory infection)     Time reflects when diagnosis was documented in both MDM as applicable and the Disposition within this note       Time User Action Codes Description Comment    12/14/2023 11:09 PM Sveta Mckeon Add [J40] Bronchitis     12/14/2023 11:09 PM Bob Garza Add [J06.9] URI (upper respiratory infection)           ED Disposition       ED Disposition   Discharge    Condition   Stable    Date/Time   u Dec 14, 2023 11:09 PM    Comment   Philomena Aguilar discharge to home/self care.                    Follow-up Information       Follow up With Specialties Details Why Denise Hutchinson MD [facility] Medicine Schedule an appointment as soon as possible for a visit in 2 days  [address]  [phone]              Patient's Medications   Discharge Prescriptions    ALBUTEROL (PROVENTIL HFA) 90 MCG/ACT INHALER    Inhale 2 puffs every 6 (six) hours as needed for wheezing       Start Date: 12/14/2023End Date: --       Order Dose: 2 puffs Quantity: 6.7 g    Refills: 0    DOXYCYCLINE HYCLATE (VIBRAMYCIN) 100 MG CAPSULE    Take 1 capsule (100 mg total) by mouth 2 (two) times a day for 7 days       Start Date: 12/14/2023End Date: 12/21/2023       Order Dose: 100 mg       Quantity: 14 capsule    Refills: 0    PREDNISONE 20 MG TABLET    Take 3 tablets (60 mg total) by mouth daily for 5 days       Start Date: 12/14/2023End Date: 12/19/2023       Order Dose: 60 mg       Quantity: 15 tablet    Refills: 0       No discharge procedures on file.     PDMP Review       None            ED Provider  Electronically Signed by             Pat Schaefer DO  12/14/23 4314